# Patient Record
Sex: FEMALE | Race: BLACK OR AFRICAN AMERICAN | URBAN - METROPOLITAN AREA
[De-identification: names, ages, dates, MRNs, and addresses within clinical notes are randomized per-mention and may not be internally consistent; named-entity substitution may affect disease eponyms.]

---

## 2024-04-09 ENCOUNTER — OFFICE VISIT (OUTPATIENT)
Dept: URBAN - METROPOLITAN AREA CLINIC 45 | Facility: CLINIC | Age: 59
End: 2024-04-09
Payer: COMMERCIAL

## 2024-04-09 DIAGNOSIS — H43.813 VITREOUS DEGENERATION, BILATERAL: Primary | ICD-10-CM

## 2024-04-09 DIAGNOSIS — H25.813 COMBINED FORMS OF AGE-RELATED CATARACT, BILATERAL: ICD-10-CM

## 2024-04-09 DIAGNOSIS — H04.123 DRY EYE SYNDROME OF BILATERAL LACRIMAL GLANDS: ICD-10-CM

## 2024-04-09 PROCEDURE — 92134 CPTRZ OPH DX IMG PST SGM RTA: CPT | Performed by: OPTOMETRIST

## 2024-04-09 PROCEDURE — 92004 COMPRE OPH EXAM NEW PT 1/>: CPT | Performed by: OPTOMETRIST

## 2024-04-09 ASSESSMENT — INTRAOCULAR PRESSURE
OD: 16
OS: 16

## 2024-05-03 ENCOUNTER — OFFICE VISIT (OUTPATIENT)
Dept: URBAN - METROPOLITAN AREA CLINIC 45 | Facility: CLINIC | Age: 59
End: 2024-05-03
Payer: COMMERCIAL

## 2024-05-03 DIAGNOSIS — H43.813 VITREOUS DEGENERATION, BILATERAL: ICD-10-CM

## 2024-05-03 DIAGNOSIS — H25.813 COMBINED FORMS OF AGE-RELATED CATARACT, BILATERAL: ICD-10-CM

## 2024-05-03 DIAGNOSIS — H04.123 DRY EYE SYNDROME OF BILATERAL LACRIMAL GLANDS: Primary | ICD-10-CM

## 2024-05-03 PROCEDURE — 99213 OFFICE O/P EST LOW 20 MIN: CPT | Performed by: OPTOMETRIST

## 2024-05-03 ASSESSMENT — INTRAOCULAR PRESSURE
OS: 15
OD: 15

## 2024-08-28 ENCOUNTER — APPOINTMENT (RX ONLY)
Dept: URBAN - METROPOLITAN AREA CLINIC 168 | Facility: CLINIC | Age: 59
Setting detail: DERMATOLOGY
End: 2024-08-28

## 2024-08-28 DIAGNOSIS — B35.8 OTHER DERMATOPHYTOSES: ICD-10-CM

## 2024-08-28 DIAGNOSIS — B35.1 TINEA UNGUIUM: ICD-10-CM | Status: WORSENING

## 2024-08-28 PROCEDURE — ? PRESCRIPTION

## 2024-08-28 PROCEDURE — 99204 OFFICE O/P NEW MOD 45 MIN: CPT

## 2024-08-28 PROCEDURE — ? COUNSELING

## 2024-08-28 PROCEDURE — ? ADDITIONAL NOTES

## 2024-08-28 RX ORDER — FLUCONA/IBUPROF/ITRACON/TERBIN 4-2-1-4 %
SOLUTION, NON-ORAL TOPICAL BID
Qty: 15 | Refills: 11 | Status: ERX | COMMUNITY
Start: 2024-08-28

## 2024-08-28 RX ADMIN — Medication: at 00:00

## 2024-08-28 ASSESSMENT — LOCATION SIMPLE DESCRIPTION DERM: LOCATION SIMPLE: LEFT RING FINGERNAIL

## 2024-08-28 ASSESSMENT — LOCATION ZONE DERM: LOCATION ZONE: FINGERNAIL

## 2024-08-28 ASSESSMENT — LOCATION DETAILED DESCRIPTION DERM: LOCATION DETAILED: LEFT RING FINGERNAIL

## 2024-08-28 NOTE — PROCEDURE: ADDITIONAL NOTES
Additional Notes: - Condition started in 2020, continues to worsen, associated with finger pain around the nail\\n- Tried & Failed: ciclopirox, urea\\n- Terbinafine is not a good option as patient is currently on a beta-blocker (metoprolol)\\n- Fluconazole not a good option because she is on rosuvastatin. \\n- I advised she should not discontinue these medications.  If she, in the future, discontinues one of these through her doctor, we could discuss initiating orals.\\n- Will trial combination topicals for now. 
Detail Level: Detailed
Render Risk Assessment In Note?: no
Additional Notes: JOAN choi clipping obtained from the left ring fingernail.

## 2025-01-08 ENCOUNTER — APPOINTMENT (OUTPATIENT)
Dept: URBAN - METROPOLITAN AREA CLINIC 168 | Facility: CLINIC | Age: 60
Setting detail: DERMATOLOGY
End: 2025-01-08

## 2025-01-08 DIAGNOSIS — B35.1 TINEA UNGUIUM: ICD-10-CM | Status: IMPROVED

## 2025-01-08 PROCEDURE — ? PRESCRIPTION

## 2025-01-08 PROCEDURE — ? COUNSELING

## 2025-01-08 PROCEDURE — 99213 OFFICE O/P EST LOW 20 MIN: CPT

## 2025-01-08 PROCEDURE — ? ADDITIONAL NOTES

## 2025-01-08 RX ORDER — FLUCONA/IBUPROF/ITRACON/TERBIN 4-2-1-4 %
SOLUTION, NON-ORAL TOPICAL BID
Qty: 15 | Refills: 11 | Status: ERX

## 2025-01-08 ASSESSMENT — LOCATION DETAILED DESCRIPTION DERM: LOCATION DETAILED: LEFT RING FINGERNAIL

## 2025-01-08 ASSESSMENT — LOCATION SIMPLE DESCRIPTION DERM: LOCATION SIMPLE: LEFT RING FINGERNAIL

## 2025-01-08 ASSESSMENT — LOCATION ZONE DERM: LOCATION ZONE: FINGERNAIL

## 2025-01-08 NOTE — PROCEDURE: ADDITIONAL NOTES
Additional Notes: - Condition started in 2020, continues to worsen, associated with finger pain around the nail\\n- Tried & Failed: ciclopirox, urea\\n- Terbinafine is not a good option as patient is currently on a beta-blocker (metoprolol)\\n- Fluconazole not a good option because she is on rosuvastatin. \\n- I advised she should not discontinue these medications.  If she, in the future, discontinues one of these through her doctor, we could discuss initiating orals.\\n- Will trial combination topicals for now.\\n\\n1/8/24\\n-Pt is noticing some improvement with Topical medication \\n-F/U as needed
Detail Level: Detailed
Render Risk Assessment In Note?: no